# Patient Record
Sex: MALE | Race: OTHER | NOT HISPANIC OR LATINO | URBAN - METROPOLITAN AREA
[De-identification: names, ages, dates, MRNs, and addresses within clinical notes are randomized per-mention and may not be internally consistent; named-entity substitution may affect disease eponyms.]

---

## 2017-04-26 ENCOUNTER — EMERGENCY (EMERGENCY)
Facility: HOSPITAL | Age: 22
LOS: 1 days | Discharge: HOME | End: 2017-04-26
Attending: EMERGENCY MEDICINE | Admitting: EMERGENCY MEDICINE
Payer: COMMERCIAL

## 2017-04-26 VITALS
SYSTOLIC BLOOD PRESSURE: 120 MMHG | TEMPERATURE: 98 F | OXYGEN SATURATION: 100 % | HEART RATE: 98 BPM | RESPIRATION RATE: 16 BRPM | DIASTOLIC BLOOD PRESSURE: 74 MMHG

## 2017-04-26 PROCEDURE — 73564 X-RAY EXAM KNEE 4 OR MORE: CPT | Mod: 26,LT

## 2017-04-26 PROCEDURE — 99284 EMERGENCY DEPT VISIT MOD MDM: CPT

## 2017-04-26 RX ORDER — IBUPROFEN 200 MG
600 TABLET ORAL ONCE
Qty: 0 | Refills: 0 | Status: COMPLETED | OUTPATIENT
Start: 2017-04-26 | End: 2017-04-26

## 2017-04-26 RX ADMIN — Medication 600 MILLIGRAM(S): at 19:54

## 2017-04-26 NOTE — ED PROVIDER NOTE - CHPI ED SYMPTOMS NEG
no chest pain, no shortness of breath, no nausea, no vomiting, no numbness/weakness/tingling/no loss of consciousness

## 2017-04-26 NOTE — ED PROVIDER NOTE - CARE PLAN
Principal Discharge DX:	Knee pain  Instructions for follow-up, activity and diet:	Follow up with ortho this week.  Referral list provided.  Rest, ice and elevate knee.  Ambulate as tolerated. Take Motrin 600mg every 8 hrs for pain with food. Worsening pain, swelling, weakness, numbness return to ER
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
## 2017-04-26 NOTE — ED PROVIDER NOTE - PROGRESS NOTE DETAILS
BRYCE Helton: Received sign out from BRYCE LARRY to reassess patient and follow up on xrays. Xray wnl, no evidence of fx. Pt ambulating in RW w/o issue. Will dc w/ NSAIDs and ortho follow up if sx persist.

## 2017-04-26 NOTE — ED PROVIDER NOTE - PLAN OF CARE
Follow up with ortho this week.  Referral list provided.  Rest, ice and elevate knee.  Ambulate as tolerated. Take Motrin 600mg every 8 hrs for pain with food. Worsening pain, swelling, weakness, numbness return to ER

## 2017-04-26 NOTE — ED PROVIDER NOTE - NS ED MD SCRIBE ATTENDING SCRIBE SECTIONS
PHYSICAL EXAM/HIV/DISPOSITION/HISTORY OF PRESENT ILLNESS/REVIEW OF SYSTEMS/PAST MEDICAL/SURGICAL/SOCIAL HISTORY/VITAL SIGNS( Pullset)

## 2021-04-25 ENCOUNTER — INPATIENT (INPATIENT)
Facility: HOSPITAL | Age: 26
LOS: 3 days | Discharge: HOME | End: 2021-04-29
Attending: PSYCHIATRY & NEUROLOGY | Admitting: PSYCHIATRY & NEUROLOGY
Payer: MEDICAID

## 2021-04-25 VITALS
SYSTOLIC BLOOD PRESSURE: 134 MMHG | HEART RATE: 89 BPM | RESPIRATION RATE: 16 BRPM | DIASTOLIC BLOOD PRESSURE: 84 MMHG | TEMPERATURE: 99 F | OXYGEN SATURATION: 100 %

## 2021-04-25 DIAGNOSIS — F43.24 ADJUSTMENT DISORDER WITH DISTURBANCE OF CONDUCT: ICD-10-CM

## 2021-04-25 NOTE — ED ADULT NURSE NOTE - NSIMPLEMENTINTERV_GEN_ALL_ED
Patient
Implemented All Universal Safety Interventions:  [place] to call system. Call bell, personal items and telephone within reach. Instruct patient to call for assistance. Room bathroom lighting operational. Non-slip footwear when patient is off stretcher. Physically safe environment: no spills, clutter or unnecessary equipment. Stretcher in lowest position, wheels locked, appropriate side rails in place.

## 2021-04-25 NOTE — ED ADULT TRIAGE NOTE - CHIEF COMPLAINT QUOTE
Pt endorses taking a sip of antifreeze after an argument with his father at home tonight. Denies SI/HI, states "it was in the moment". Denies any pain, N/V/D. Denies PMH. Calm and cooperative in triage.

## 2021-04-26 DIAGNOSIS — T14.91XA SUICIDE ATTEMPT, INITIAL ENCOUNTER: ICD-10-CM

## 2021-04-26 DIAGNOSIS — F32.9 MAJOR DEPRESSIVE DISORDER, SINGLE EPISODE, UNSPECIFIED: ICD-10-CM

## 2021-04-26 LAB
ALBUMIN SERPL ELPH-MCNC: 4.4 G/DL — SIGNIFICANT CHANGE UP (ref 3.3–5)
ALP SERPL-CCNC: 52 U/L — SIGNIFICANT CHANGE UP (ref 40–120)
ALT FLD-CCNC: 12 U/L — SIGNIFICANT CHANGE UP (ref 4–41)
ANION GAP SERPL CALC-SCNC: 10 MMOL/L — SIGNIFICANT CHANGE UP (ref 7–14)
APPEARANCE UR: CLEAR — SIGNIFICANT CHANGE UP
AST SERPL-CCNC: 15 U/L — SIGNIFICANT CHANGE UP (ref 4–40)
BACTERIA # UR AUTO: NEGATIVE — SIGNIFICANT CHANGE UP
BASOPHILS # BLD AUTO: 0.04 K/UL — SIGNIFICANT CHANGE UP (ref 0–0.2)
BASOPHILS NFR BLD AUTO: 0.3 % — SIGNIFICANT CHANGE UP (ref 0–2)
BILIRUB SERPL-MCNC: 0.2 MG/DL — SIGNIFICANT CHANGE UP (ref 0.2–1.2)
BILIRUB UR-MCNC: NEGATIVE — SIGNIFICANT CHANGE UP
BLOOD GAS VENOUS COMPREHENSIVE RESULT: SIGNIFICANT CHANGE UP
BUN SERPL-MCNC: 14 MG/DL — SIGNIFICANT CHANGE UP (ref 7–23)
CALCIUM SERPL-MCNC: 9.2 MG/DL — SIGNIFICANT CHANGE UP (ref 8.4–10.5)
CHLORIDE SERPL-SCNC: 106 MMOL/L — SIGNIFICANT CHANGE UP (ref 98–107)
CO2 SERPL-SCNC: 26 MMOL/L — SIGNIFICANT CHANGE UP (ref 22–31)
COLOR SPEC: YELLOW — SIGNIFICANT CHANGE UP
CREAT SERPL-MCNC: 0.95 MG/DL — SIGNIFICANT CHANGE UP (ref 0.5–1.3)
DIFF PNL FLD: NEGATIVE — SIGNIFICANT CHANGE UP
EOSINOPHIL # BLD AUTO: 0.23 K/UL — SIGNIFICANT CHANGE UP (ref 0–0.5)
EOSINOPHIL NFR BLD AUTO: 2 % — SIGNIFICANT CHANGE UP (ref 0–6)
EPI CELLS # UR: 2 /HPF — SIGNIFICANT CHANGE UP (ref 0–5)
ETHANOL SERPL-MCNC: <10 MG/DL — SIGNIFICANT CHANGE UP
GLUCOSE SERPL-MCNC: 108 MG/DL — HIGH (ref 70–99)
GLUCOSE UR QL: NEGATIVE — SIGNIFICANT CHANGE UP
HCT VFR BLD CALC: 46.8 % — SIGNIFICANT CHANGE UP (ref 39–50)
HGB BLD-MCNC: 15.2 G/DL — SIGNIFICANT CHANGE UP (ref 13–17)
HYALINE CASTS # UR AUTO: 19 /LPF — HIGH (ref 0–7)
IANC: 8.5 K/UL — SIGNIFICANT CHANGE UP (ref 1.5–8.5)
IMM GRANULOCYTES NFR BLD AUTO: 0.6 % — SIGNIFICANT CHANGE UP (ref 0–1.5)
KETONES UR-MCNC: NEGATIVE — SIGNIFICANT CHANGE UP
LEUKOCYTE ESTERASE UR-ACNC: ABNORMAL
LYMPHOCYTES # BLD AUTO: 1.83 K/UL — SIGNIFICANT CHANGE UP (ref 1–3.3)
LYMPHOCYTES # BLD AUTO: 16 % — SIGNIFICANT CHANGE UP (ref 13–44)
MAGNESIUM SERPL-MCNC: 2 MG/DL — SIGNIFICANT CHANGE UP (ref 1.6–2.6)
MCHC RBC-ENTMCNC: 29.7 PG — SIGNIFICANT CHANGE UP (ref 27–34)
MCHC RBC-ENTMCNC: 32.5 GM/DL — SIGNIFICANT CHANGE UP (ref 32–36)
MCV RBC AUTO: 91.4 FL — SIGNIFICANT CHANGE UP (ref 80–100)
MONOCYTES # BLD AUTO: 0.76 K/UL — SIGNIFICANT CHANGE UP (ref 0–0.9)
MONOCYTES NFR BLD AUTO: 6.6 % — SIGNIFICANT CHANGE UP (ref 2–14)
NEUTROPHILS # BLD AUTO: 8.5 K/UL — HIGH (ref 1.8–7.4)
NEUTROPHILS NFR BLD AUTO: 74.5 % — SIGNIFICANT CHANGE UP (ref 43–77)
NITRITE UR-MCNC: NEGATIVE — SIGNIFICANT CHANGE UP
NRBC # BLD: 0 /100 WBCS — SIGNIFICANT CHANGE UP
NRBC # FLD: 0 K/UL — SIGNIFICANT CHANGE UP
OSMOLALITY SERPL: 305 MOSM/KG — HIGH (ref 275–295)
PCP SPEC-MCNC: SIGNIFICANT CHANGE UP
PH UR: 6 — SIGNIFICANT CHANGE UP (ref 5–8)
PHOSPHATE SERPL-MCNC: 3.1 MG/DL — SIGNIFICANT CHANGE UP (ref 2.5–4.5)
PLATELET # BLD AUTO: 255 K/UL — SIGNIFICANT CHANGE UP (ref 150–400)
POTASSIUM SERPL-MCNC: 3.6 MMOL/L — SIGNIFICANT CHANGE UP (ref 3.5–5.3)
POTASSIUM SERPL-SCNC: 3.6 MMOL/L — SIGNIFICANT CHANGE UP (ref 3.5–5.3)
PROT SERPL-MCNC: 7.5 G/DL — SIGNIFICANT CHANGE UP (ref 6–8.3)
PROT UR-MCNC: ABNORMAL
RBC # BLD: 5.12 M/UL — SIGNIFICANT CHANGE UP (ref 4.2–5.8)
RBC # FLD: 11.9 % — SIGNIFICANT CHANGE UP (ref 10.3–14.5)
RBC CASTS # UR COMP ASSIST: 2 /HPF — SIGNIFICANT CHANGE UP (ref 0–4)
SARS-COV-2 RNA SPEC QL NAA+PROBE: SIGNIFICANT CHANGE UP
SODIUM SERPL-SCNC: 142 MMOL/L — SIGNIFICANT CHANGE UP (ref 135–145)
SP GR SPEC: 1.03 — HIGH (ref 1.01–1.02)
TOXICOLOGY SCREEN, DRUGS OF ABUSE, SERUM RESULT: SIGNIFICANT CHANGE UP
TSH SERPL-MCNC: 0.57 UIU/ML — SIGNIFICANT CHANGE UP (ref 0.27–4.2)
UROBILINOGEN FLD QL: ABNORMAL
WBC # BLD: 11.43 K/UL — HIGH (ref 3.8–10.5)
WBC # FLD AUTO: 11.43 K/UL — HIGH (ref 3.8–10.5)
WBC UR QL: 16 /HPF — HIGH (ref 0–5)

## 2021-04-26 PROCEDURE — 99285 EMERGENCY DEPT VISIT HI MDM: CPT

## 2021-04-26 PROCEDURE — 71045 X-RAY EXAM CHEST 1 VIEW: CPT | Mod: 26

## 2021-04-26 RX ORDER — HYDROXYZINE HCL 10 MG
50 TABLET ORAL EVERY 6 HOURS
Refills: 0 | Status: DISCONTINUED | OUTPATIENT
Start: 2021-04-26 | End: 2021-04-29

## 2021-04-26 RX ORDER — DIPHENHYDRAMINE HCL 50 MG
25 CAPSULE ORAL ONCE
Refills: 0 | Status: COMPLETED | OUTPATIENT
Start: 2021-04-26 | End: 2021-04-26

## 2021-04-26 RX ADMIN — Medication 2 MILLIGRAM(S): at 07:28

## 2021-04-26 RX ADMIN — Medication 25 MILLIGRAM(S): at 06:40

## 2021-04-26 RX ADMIN — Medication 50 MILLIGRAM(S): at 21:22
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
## 2021-04-26 NOTE — BH PATIENT PROFILE - FUNCTIONAL SCREEN CURRENT LEVEL: DRESSING, MLM
Called patient about prescription. He said KL needs to fill out Medicare form B for his testing strips for insurance to cover.    0 = independent

## 2021-04-26 NOTE — ED ADULT NURSE REASSESSMENT NOTE - NS ED NURSE REASSESS COMMENT FT1
Report given to ASHVIN Gonzalez at [facility] in [place]. EMS called for transport, states their estimated time of arrival is 1hr. Pt continues to be sleeping at this time. In no apparent distress. Will reassess.

## 2021-04-26 NOTE — ED BEHAVIORAL HEALTH ASSESSMENT NOTE - RISK ASSESSMENT
Unmodifiable risk factors include age, male sex, hx of aggression towards property and others, recent suicidal behavior with stated suicidal intention at time of toxic ingestion. Modifiable risk factors include emotional dysregulation, daily cannabis use, lack of connection to treatment. Protective factors include denial of current SI/HI, lack of psychotic or manic history, lack of prior psych hospitalizations, residential stability, social support from family. At this time patient is at acutely elevated risk of harm to self. High Acute Suicide Risk

## 2021-04-26 NOTE — ED PROVIDER NOTE - CLINICAL SUMMARY MEDICAL DECISION MAKING FREE TEXT BOX
25M p/w antifreeze ingestion.  Mild CP here, exam unremarkable, VSS.  Will obtain labs to assess for toxic alcohol poisoning.  Will obtain EKG and psych clearance labs.  If marked derangements suggestive of significant poisoning, will obtain emergent toxicologic consult.  Will consult psych once labs result.  Will reassess and dispo pending results.

## 2021-04-26 NOTE — ED BEHAVIORAL HEALTH ASSESSMENT NOTE - DIFFERENTIAL
suicidal behavior  Cluster B traits vs personality disorder  r/o substance-induced mood disorder  r/o cannabis use disorder
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
## 2021-04-26 NOTE — ED BEHAVIORAL HEALTH ASSESSMENT NOTE - NSACTIVEVENT_PSY_ALL_CORE
Triggering events leading to humiliation, shame, and/or despair (e.g., Loss of relationship, financial or health status) (real or anticipated)/Substance intoxication or withdrawal

## 2021-04-26 NOTE — ED PROVIDER NOTE - OBJECTIVE STATEMENT
25M w/ no PMHx p/w antifreeze ingestion.  States he's been very angry the last few days and was in a heated argument w/ his father and ingested some antifreeze.  States he just wanted everything to stop so took it.  States he took a gulp but spat most of it out and swallowed very little.  Had some CP after.  Has had passive suicidal thoughts over the last few days but this is the first time he's acted on it.  Daily marijuana smoker.  Denies any SI/HI currently, but had them earlier.  No AH/VH.

## 2021-04-26 NOTE — ED PROVIDER NOTE - PROGRESS NOTE DETAILS
Antione PGY2 - No AG or acidemia on VBG.  Labs generally unremarkable.  EKG does not show any emergent findings.  Pt. very well-appearing.  No indication for emergent toxicology consultation.

## 2021-04-26 NOTE — ED ADULT NURSE REASSESSMENT NOTE - NS ED NURSE REASSESS COMMENT FT1
Pt sleeping comfortably at this time, calm and cooperative, awaiting for bed at [facility]. [facility] [place] states bed is not ready and Pt sleeping comfortably at this time, calm and cooperative, vital signs stable, in no apparent distress, awaiting for bed at [facility]. [facility] [place] states bed is not ready and they will not have a discharge until "late afternoon."

## 2021-04-26 NOTE — ED ADULT NURSE REASSESSMENT NOTE - NS ED NURSE REASSESS COMMENT FT1
Received report from Del LOCK, patient brought to low acuity . IV removed and pt undressed, belongings secured. Marijuana found on pt, charge aware and security notified. Pt offers no complaints, calm and cooperative at this time. RR even and unlabored, vitals as documented.

## 2021-04-26 NOTE — ED PROVIDER NOTE - ATTENDING CONTRIBUTION TO CARE
I performed a face-to-face evaluation of the patient and performed a history and physical examination. I agree with the history and physical examination.    Frustrated with father last night. Drank small amount of antifreeze. Lab results unremarkable. No indication for tox consult. Psych consult to determine benefit of admission.

## 2021-04-26 NOTE — ED BEHAVIORAL HEALTH ASSESSMENT NOTE - SUMMARY
Patient is a 26yo M, domiciled in private residence with parents and older brother, dropped out of 12th grade, self-employed working on cars, no formal psych hx, no prior psych hospitalizations, no psych meds, no outpatient therapy, denies prior hx of SA/SIB, daily marijuana use, occasional EtOH use, +hx of aggression/violence towards property and others, no PMHx, BIB father after drinking antifreeze with suicidal intent during argument with father.    On interview patient is cooperative but guarded around events leading to admission; speaks in low volume, is distantly related, appears mildly anxious but not acutely manic or psychotic. He endorses impulsive toxic ingestion iso emotional dysregulation during argument with father and states that he ingested the antifreeze in front of his father to "show him how angry I was." However, patient was aware of the potential lethality of antifreeze ingestion prior to taking action. Per father's report, father had to remove antifreeze from patient's possession to stop him from drinking more. There is also concern about the extent of patient's impulsivity and aggression (including throwing TV down stair case) over relatively minor argument. Given the above factors, the patient's actions earlier tonight may represent a true suicide attempt and therefore patient is at acutely elevated risk of harm to self. He requires inpatient psychiatric hospitalization for safety and stabilization. Patient is a 24yo M, domiciled in private residence with parents and older brother, dropped out of 12th grade, self-employed working on cars, no formal psych hx, no prior psych hospitalizations, no psych meds, no outpatient therapy, denies prior hx of SA/SIB, daily marijuana use, occasional EtOH use, +hx of aggression/violence towards property and others, no PMHx, BIB father after drinking antifreeze with suicidal intent during argument with father.    On interview patient is cooperative but guarded around events leading to admission; speaks in low volume, is distantly related, appears mildly anxious but not acutely manic or psychotic. He endorses impulsive toxic ingestion iso emotional dysregulation during argument with father and states that he ingested the antifreeze in front of his father to "show him how angry I was." However, patient was aware of the potential lethality of antifreeze ingestion prior to taking action. Per father's report, father had to remove antifreeze from patient's possession to stop him from drinking more. There is also concern about the extent of patient's impulsivity and aggression (including throwing TV down stair case) over relatively minor argument. Given the above factors, the patient's actions earlier tonight represents a true suicide attempt and therefore patient is at acutely elevated risk of harm to self. He requires inpatient psychiatric hospitalization for safety and stabilization.

## 2021-04-26 NOTE — ED BEHAVIORAL HEALTH ASSESSMENT NOTE - OTHER PAST PSYCHIATRIC HISTORY (INCLUDE DETAILS REGARDING ONSET, COURSE OF ILLNESS, INPATIENT/OUTPATIENT TREATMENT)
Diagnoses: none  Inpatient: none  Outpatient: none  SA/SIB: endorses intermittent passive SI during episodes of anger, denies SA prior to tonight, denies hx of SIB  Meds: none

## 2021-04-26 NOTE — ED BEHAVIORAL HEALTH NOTE - BEHAVIORAL HEALTH NOTE
SW attempted to reach pt's mother, Coy Cordoba, at [phone] to obtain collateral information.  Call went directly to voice mail; SW left a message requesting a return phone call.    LIZETT called pt's father, Beryl, at [phone]-message came on stating that the subscriber is not available.  Unable to leave a message at this time. SW attempted to reach pt's mother, Coy Cordoba, at [phone] to obtain collateral information.  Call went directly to voice mail; SW left a message requesting a return phone call.    LIZETT called pt's father, Beryl, at [phone]-message came on stating that the subscriber is not available.  Unable to leave a message at this time.    Addendum:  Call back received from pt's mother, Coy.  Pt's mother reports that pt has 'anger issues' and states that they have been going on for a few months.  Pt's mother states that pt often gets angry when he is smoking marijuana or when his parents talk with him about getting a job or doing something.  She reports that when pt gets angry, he threatens to kill himself.  Mother states that today is the first time he actually tried to harm himself by drinking antifreeze.  Mother states that they encourage pt to go to counseling or get a psychiatrist, but he refuses.  Pt's mother reports that pt smokes marijuana daily, and does drink alcohol as well.  She states that pt has never threatened to harm himself prior to this evening.  Mother states that pt could benefit from counseling and that he 'needs help'.  Mother also states that if pt is discharged, she can come and pick him up.

## 2021-04-26 NOTE — ED ADULT NURSE REASSESSMENT NOTE - NS ED NURSE REASSESS COMMENT FT1
Pt is awaiting for EMS transport to [facility].  Report was given before 12pm.  Pt has been resting and refused food or hydration.  Pt expressing that he wants to go home but he is compliant with plan to go [facility].
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
## 2021-04-26 NOTE — ED BEHAVIORAL HEALTH ASSESSMENT NOTE - DETAILS
hx of physical aggression towards others and property, denies physical aggression in the past year drank antifreeze during argument with father with suicidal intent parents aware of admission voicemail left for Dr. Mena - [phone]

## 2021-04-26 NOTE — ED PROVIDER NOTE - PHYSICAL EXAMINATION
GENERAL: Patient awake alert NAD.  HEENT: NC/AT, Moist mucous membranes.  LUNGS: CTAB, no wheezes or crackles.  CARDIAC: RRR, no m/r/g.  ABDOMEN: Soft, NT, ND, No rebound, guarding.  EXT: No edema. No calf tenderness. CV 2+DP/PT bilaterally.   MSK: No pain with movement, no deformities.  NEURO: A&Ox3. Moving all extremities.  SKIN: Warm and dry. No rash.  PSYCH: Normal affect.

## 2021-04-26 NOTE — ED ADULT NURSE REASSESSMENT NOTE - DESCRIPTION
Patient anxious, pacing and expressing fear of the unknown as regards to psych admission. Pt however remains in good behavioral control. Communicating well with staff and requesting meds for sleep. Medicated with Benadryl 25mg PO with no +effect. PO Ativan 2mg given additionally. Will monitor for effect. Safety maintained.

## 2021-04-26 NOTE — ED ADULT NURSE REASSESSMENT NOTE - NS ED NURSE REASSESS COMMENT FT1
Report received from night RN. Pt resting comfortably in bed, denies any pain/ symptoms at this time, states he is feeling better and would like to go home, calm and cooperative at this time, does not appear to be in distress, pt currently boarding in , will reassess.

## 2021-04-26 NOTE — ED BEHAVIORAL HEALTH NOTE - BEHAVIORAL HEALTH NOTE
COVID Exposure Screen- Patient  1.	*Have you had a COVID-19 test in the last 90 days?  (  ) Yes   (X) No   (  ) Unknown- Reason: _____  IF YES PROCEED TO QUESTION #2. IF NO OR UNKNOWN, PLEASE SKIP TO QUESTION #3.  2.	Date of test(s) and result(s): ________  3.	*Have you tested positive for COVID-19 antibodies? (  ) Yes   (X) No   (  ) Unknown- Reason: _____  IF YES PROCEED TO QUESTION #4. IF NO or UNKNOWN, PLEASE SKIP TO QUESTION #5.  4.	Date of positive antibody test: ________  5.	*Have you received 2 doses of the COVID-19 vaccine? (  ) Yes   (X) No   (  ) Unknown- Reason: _____   IF YES PROCEED TO QUESTION #6. IF NO or UNKNOWN, PLEASE SKIP TO QUESTION #7.  6.	Date of second dose: ________  7.	*In the past 10 days, have you been around anyone with a positive COVID-19 test?* (  ) Yes   (X) No   (  ) Unknown- Reason: ____  IF YES PROCEED TO QUESTION #8. IF NO or UNKNOWN, PLEASE SKIP TO QUESTION #13.  8.	Were you within 6 feet of them for at least 15 minutes? (  ) Yes   (  ) No   (  ) Unknown- Reason: _____  9.	Have you provided care for them? (  ) Yes   (  ) No   (  ) Unknown- Reason: ______  10.	Have you had direct physical contact with them (touched, hugged, or kissed them)? (  ) Yes   (  ) No    (  ) Unknown- Reason: _____  11.	Have you shared eating or drinking utensils with them? (  ) Yes   (  ) No    (  ) Unknown- Reason: ____  12.	Have they sneezed, coughed, or somehow gotten respiratory droplets on you? (  ) Yes   (  ) No    (  ) Unknown- Reason: ______  13.	*Have you been out of [place] within the past 10 days?* (X) Yes   (  ) No   (  ) Unknown- Reason: _____  IF YES PLEASE ANSWER THE FOLLOWING QUESTIONS:  14.	Which state/country have you been to? [place]  15.	Were you there over 24 hours? (  ) Yes   (X) No    (  ) Unknown- Reason: ______  16.	Date of return to [facility]: 4/24/21

## 2021-04-26 NOTE — ED PROVIDER NOTE - NS ED ROS FT
General: denies fever, chills, weight loss/weight gain.  HENT: denies nasal congestion, sore throat, rhinorrhea, ear pain.  Eyes: denies visual changes, blurred vision, eye discharge, eye redness.  Neck: denies neck pain, neck swelling.  CV: +chest pain; denies palpitations.  Resp: denies difficulty breathing, cough.  Abdominal: denies nausea, vomiting, diarrhea, abdominal pain, blood in stool, dark stool.  MSK: denies muscle aches, bony pain, leg pain, leg swelling.  Neuro: denies headaches, numbness, tingling, dizziness, lightheadedness.  Skin: denies rashes, cuts, bruises.  Hematologic: denies unexplained bruises.  Psych: SI at home.

## 2021-04-26 NOTE — ED BEHAVIORAL HEALTH ASSESSMENT NOTE - CASE SUMMARY
Pt is a 26yo single, male with no formal prior psychiatric history, who presented today s/p ingestion of antifreeze as suicide attempt in context of argument with father. Pt also threw a television down the stairs prior to this. On exam, pt is highly soft spoken, at times difficult to hear. He reports that he knew what antifreeze could do, but in the moment, he did not care if he lived or , admitting briefly that his attempt was a suicide attempt. PT reports that he knows about it from "around" and from TV shows. He denies any current SI/HI, depressive or manic sxs; states that he is embarrassed the police had to come for him. He does admit that he has been told to seek counseling for his anger issues, but he has never, proceeded with this. 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 Father reports argument was over the patient wanting another room. Father/Mother feel that the more he smokes MJ the more irritable he gets as well as mad. It is during those times when he will make an SI statement, but tonight he did actually act on it. There are also somewhat varied accounts of ingestion from pt and father which is concerning and cannot be teased out here in the ED. Due to overall picture of male, with h/o impulsivity, aggression, and agitation, drinking antifreeze and telling his father to "watch what I'm going to do" "I'm going to kill myself!" then proceed to drink the antifreeze demonstrates poor judgement, and possible underlying depressive state. Pt is in need of psychiatric stabilization at this time, will board for bed availability. Pt and mother informed.

## 2021-04-27 DIAGNOSIS — F12.20 CANNABIS DEPENDENCE, UNCOMPLICATED: ICD-10-CM

## 2021-04-27 DIAGNOSIS — F60.89 OTHER SPECIFIC PERSONALITY DISORDERS: ICD-10-CM

## 2021-04-27 LAB
A1C WITH ESTIMATED AVERAGE GLUCOSE RESULT: 5.4 % — SIGNIFICANT CHANGE UP (ref 4–5.6)
CHOLEST SERPL-MCNC: 176 MG/DL — SIGNIFICANT CHANGE UP
COVID-19 SPIKE DOMAIN AB INTERP: NEGATIVE — SIGNIFICANT CHANGE UP
COVID-19 SPIKE DOMAIN ANTIBODY RESULT: 0.4 U/ML — SIGNIFICANT CHANGE UP
ESTIMATED AVERAGE GLUCOSE: 108 MG/DL — SIGNIFICANT CHANGE UP (ref 68–114)
HDLC SERPL-MCNC: 42 MG/DL — SIGNIFICANT CHANGE UP
LIPID PNL WITH DIRECT LDL SERPL: 115 MG/DL — HIGH
NON HDL CHOLESTEROL: 134 MG/DL — HIGH
SARS-COV-2 IGG+IGM SERPL QL IA: 0.4 U/ML — SIGNIFICANT CHANGE UP
SARS-COV-2 IGG+IGM SERPL QL IA: NEGATIVE — SIGNIFICANT CHANGE UP
TRIGL SERPL-MCNC: 95 MG/DL — SIGNIFICANT CHANGE UP

## 2021-04-27 PROCEDURE — 99222 1ST HOSP IP/OBS MODERATE 55: CPT

## 2021-04-27 RX ADMIN — Medication 50 MILLIGRAM(S): at 21:57

## 2021-04-27 NOTE — BH INPATIENT PSYCHIATRY ASSESSMENT NOTE - HPI (INCLUDE ILLNESS QUALITY, SEVERITY, DURATION, TIMING, CONTEXT, MODIFYING FACTORS, ASSOCIATED SIGNS AND SYMPTOMS)
Patient is a 26yo M, domiciled in private residence with parents and older brother, dropped out of 12th grade, self-employed working on cars, no formal psych hx, no prior psych hospitalizations, no psych meds, no outpatient therapy, denies prior hx of SA/SIB, daily marijuana use, occasional EtOH use, +hx of aggression/violence towards property and others, no PMHx, BIB father to [facility] ED after suspected suicide attempt by drinking antifreeze during argument with father, now transferred to [facility] overnight.    Pt reports he had an argument with his father on Sunday 4/25/2021 regarding a room in the house that he wanted to store his things in. He reports he usually doesn't have arguments with his father so this made him more emotional. He reports going to get a bottle of antifreeze from the basement nearby and returned to his father. He reports acting as if he drank the antifreeze but denies drinking it and that he only let it spill down his shirt. He reports that his father did not take the bottle but he himself put it down. He reports wanting to scare his father into thinking that he could hurt himself but that he had no intention of self-injury or suicide, denies drinking or placing any of the antifreeze in his mouth. He reports knowing that antifreeze could damage his organs. He denies drinking or acting to drink antifreeze in the past. He denies thinking of any other means to hurt himself, attempt suicide, or pretend to hurt himself in front of his father at the time or ever in the past. He reports his father called the ambulance because his father thought he really drank antifreeze and was concerned. He reports his mother also was glad that his father called the ambulance because she was also scared about his health and drinking antifreeze. He reports, "I have too much to live for, suicide isn't even in the picture." He denies current S/H I/I/P.  He reports getting along with his brother and that he is only annoyed with his sister-in-law but that he does not fight or argue with her. He reports having a girlfriend and that they get along well with only 1-2 fights a year. He reports that he does not have issues with his anger and denies ever becoming verbally or physically aggressive. When confronted about ED report that he threw TV down the stairs, he reports the TV belonged to him and that this is not an issue.  He endorses using weed on a daily basis and does not feel that this has caused mood or behavioral changes. He also endorses vaping. He denies all other substance use, including alcohol, cocaine, pain killers.  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 He reports he does not need psychiatric help and does not want behavioral health services to follow up with. He reports his parents are thinking of getting a  to get him out of the hospital.    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 She reports he was upset and he made a mistake on Sunday night. She feels he has cooled down and that he sounds calm, comfortable when she spoke to him on the phone. He told her he never meant to do hurt himself and that he never meant to drink the antifreeze. She reports it never went inside his mouth and that he wanted to prove something to his dad. She reports that she felt he didn't need the ambulance on the night of Sun 4/25 because pt had told her that he hadn't really drank the antifreeze before the ambulance came.  She denies prior episodes of suicidal or self-injurious behavior or use of antifreeze in prior arguments. She denies that her son argues frequently with family members or have difficulty with his anger. She reports he usually will walk away from arguments and will play video games, watch TV, or work on his car. She denies any prior episodes of physical or verbal aggression in the past. She denies prior arguments leading to calling police. She is not aware of him using any other substance than weed. She hasn't noticed any mood changes or anger issues with weed use.  She reports pt will not eat or use the bathroom for bowel movements outside the house since he was a child.  She denies having guns or weapons in the house.    Per pt's father, he wants his son to come home because they need help with restaurant and they are having significant business losses without his son's help. He reports that pt is regretful of what happened and that it is potentially damaging for pt to be in the hospital, may make him depressed. He reports he did get in an argument with pt about a room in the house and that this was unusual as they do not usually fight. Pt went to get the antifreeze and returned to father. Pt's father reports he does not remember what the pt said to him. Pt then went onto act as if he was drinking the antifreeze, but pt's father reports he checked a video clip from cameras that they have in the house and saw that the pt did not actually drink the antifreeze and only spilled some down his shirt. He also reports initially thinking that pt intentionally threw the TV down the stairs but after checking video footage, he saw that his son accidentally hit the TV with his foot as the TV was near the staircase on its way to be thrown out. He reports the video footages cannot be replayed as he has lost them. He reports regarding the pt, "He's a good boy. I have no problem. It was not intentional. He's like a baby."  Pt's father reports that pt has no anger issues, has never threatened to kill himself. He reports pt gets along with his brother and sister-in-law, denies issues between pt and girlfriend. He denies prior verbal or physical aggression from pt in the past.  When confronted about inconsistency in collateral from ED note vs. current report, he states that he was overly excited and gave rash answers at the ED.  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    Per [facility] ED report 4/26/21:  "Patient states that tonight he had an argument with his father because his father was in his room and he felt his space was invaded. Patient states he and his father were yelling at each other for about 15 minutes, at which point he had the thought to impulsively drink antifreeze. He got antifreeze from the basement, brought it upstairs, and drank it in front of his father to "show him how angry I was." Patient endorses suicidal intent in the moment when he drank the antifreeze. Says that he stated to father "I'm going to kill myself" prior to starting to drink it. As soon as he started to take a drink he thought it was a bad idea so spit out most of it, swallowed a little. 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    Patient states he has chronic anger issues. Feels very easily angered by interpersonal issues and argues with family and girlfriend frequently. Also has conflict with brother's wife who stays with them every weekend. Endorses suicidal ideation when he feels very angry but denies ever acting on these thoughts prior to tonight. Says when he feels angry he has thoughts like "I just want the anger to stop" and "I don't care what happens to me." Denies hx of SIB. Has hx of physical fights in school due to anger. Also with hx of property destruction and physical aggression towards family and girlfriend (no arrests, no charges). States that in the past year he has been only verbally aggressive when angry. States he uses marijuana daily and that helps to calm him down. He endorses feeling anxious about his future and financial stability daily for the past year. Sleeping and eating well; states marijuana also helps with sleep. Denies depressed mood, anhedonia, poor concentration, and hopelessness. Denies hx c/f shalini or psychosis. He has never seen a therapist or psychiatrist.    Collateral obtained from patient's father Mr. Cordoba. Father states patient became angry earlier tonight because he demanded another room for himself in the house. Patient threw a TV down the stair case. Patient got antifreeze from the basement, said "watch what I'm going to do," and started to drink it until father took it away from him. Father states patient has pattern of getting very angry about 1x/week and saying "I'm going to kill myself," which has been happening for approx 2 years. Father denies patient taking action to harm himself before tonight. Denies patient has stated suicidal plan before tonight. Father believes patient's anger is related to his marijuana use. Father has been concerned that patient would attempt to harm himself during prior anger episodes."

## 2021-04-27 NOTE — PSYCHIATRIC REHAB INITIAL EVALUATION - NSBHALCSUBTREAT_PSY_ALL_CORE
Patient was not currently receiving outpatient therapy, and was not taking psychotropic medication prior to admission./None

## 2021-04-27 NOTE — BH INPATIENT PSYCHIATRY ASSESSMENT NOTE - CASE SUMMARY
Pt is a 26yo man with no PPHx presenting after heated argument with his father/family during which he threatened to commit suicide by ingestion of antifreeze (and did actually attempt to do so). Pt and parents now minimizing argument and suicide attempt, very preoccupied with discharge, and pt adamantly denying SIIP (also expresses remorse with regards to recent argument and potentially attempting to ingest antifreeze, which he describes as quite impulsive). Pt and parents are now also inconsistent in their report of what occurred. Despite this, pt without any evidence of acute mood or psychotic disorder. He has been in good behavioral control on the unit. Mood has been stable and pt sleeping well with good appetite. No evidence of shalini or psychosis. Suspect ddx intermittent explosive d/o vs cluster b personality pathology (or both). Pt refusing meds and no indication for such at this time. Will continue to monitor pt for any emergence of acute pathology. Pt may benefit from therapy with focus on anger management. Will use prn Atarax for management of anxiety.

## 2021-04-27 NOTE — BH INPATIENT PSYCHIATRY ASSESSMENT NOTE - DETAILS
drank antifreeze during argument with father with suicidal intent Positive for hx of aggression per ED note but pt and pt's parents now denying any aggression in the past

## 2021-04-27 NOTE — BH INPATIENT PSYCHIATRY ASSESSMENT NOTE - NSBHASSESSSUMMFT_PSY_ALL_CORE
Patient is a 24yo M, domiciled in private residence with parents and older brother, dropped out of 12th grade, self-employed working on cars, no formal psych hx, no prior psych hospitalizations, no psych meds, no outpatient therapy, denies prior hx of SA/SIB, daily marijuana use, occasional EtOH use, +hx of aggression/violence towards property and others, no PMHx, BIB father to [facility] ED after suspected suicide attempt by drinking antifreeze during argument with father, now transferred to [facility] overnight.    Pt and parents are both minimizing events leading up to admission and denying any suicidal intent behind drinking antifreeze, which is inconsistent with what they reported Mon 4/26 at [facility] ED. Pt and parents are also now denying all previously reported symptoms with difficulty with anger, aggression that they had endorsed at [facility] ED 4/26 which are concerning for IED. Uncertain if there may be substance-induced mood changes or psychosis with reported marijuana use, although denying and does not exhibit significant mood/psychotic symptoms at this time. There are also major inconsistencies present between different collaterals upon today's initial interviews with treatment team (mom reporting pt didn't need ambulance, pt reporting mom would also have called ambulance & father saying pt accidentally kicked TV, pt saying he was free to throw TV as it was his property). Both pt and pt's parents are fixated on discharge and presenting themselves very differently from ED visit. Pt does appear regretful and that he had not attempted suicide prior to Sun 4/25 but he has been threatening in the past (per [facility] note) with suicidal statements and appears to have acted impulsively on this, requiring further observation for safety.    1. C/w Atarax 50mg q6h PRN for anxiety  2. C/w Ativan 2mg IM PRN for agitation  3. Will continue to offer pt options for psychiatric f/u

## 2021-04-27 NOTE — BH INPATIENT PSYCHIATRY ASSESSMENT NOTE - NSBHMETABOLIC_PSY_ALL_CORE_FT
BMI:   HbA1c: A1C with Estimated Average Glucose Result: 5.4 % (04-27-21 @ 10:19)    Glucose:   BP: 133/81 (04-26-21 @ 15:58) (126/71 - 134/84)  Lipid Panel: Date/Time: 04-27-21 @ 10:19  Cholesterol, Serum: 176  Direct LDL: --  HDL Cholesterol, Serum: 42  Total Cholesterol/HDL Ration Measurement: --  Triglycerides, Serum: 95

## 2021-04-27 NOTE — BH INPATIENT PSYCHIATRY ASSESSMENT NOTE - NSBHCHARTREVIEWVS_PSY_A_CORE FT
Vital Signs Last 24 Hrs  T(C): 35.6 (27 Apr 2021 08:30), Max: 36.9 (26 Apr 2021 15:58)  T(F): 96.1 (27 Apr 2021 08:30), Max: 98.4 (26 Apr 2021 15:58)  HR: 65 (26 Apr 2021 15:58) (65 - 65)  BP: 133/81 (26 Apr 2021 15:58) (133/81 - 133/81)  BP(mean): --  RR: 18 (26 Apr 2021 15:58) (18 - 18)  SpO2: 100% (26 Apr 2021 15:58) (100% - 100%)

## 2021-04-27 NOTE — BH INPATIENT PSYCHIATRY ASSESSMENT NOTE - CURRENT MEDICATION
MEDICATIONS  (STANDING):    MEDICATIONS  (PRN):  hydrOXYzine hydrochloride 50 milliGRAM(s) Oral every 6 hours PRN Anxiety  LORazepam   Injectable 2 milliGRAM(s) IntraMuscular Once PRN severe agitation

## 2021-04-27 NOTE — BH SOCIAL WORK INITIAL PSYCHOSOCIAL EVALUATION - NSHIGHRISKBEH_PSY_ALL_CORE
Message  Follow up on results from ED visit for primary HSV outbreak  HSV viral culture confirms diagnosis as same  STD screening notable for positive gonorrhea  Ordered ceftriaxone 250mg IM and Azithromycin 1gm PO  RN team to notify patient and help her schedule appointment, as this has not been completed yet  Plan  Gonorrhea    · Azithromycin 500 MG Oral Tablet; Take 2 tablets in one dose by mouth; To  Be Done: [id number]   · CefTRIAXone Sodium 250 MG Injection Solution Reconstituted; INJECT 250   MG Intramuscular; To Be Done: [id number]    Results/Data     (1) CHLAMYDIA/GC AMPLIFIED DNA, PCR   CHLAMYDIA,AMPLIFIED DNA PROBE: C  trachomatis Amplified DNA Negative   Reference Range C  trachomatis Amplified DNA Negative  N  GONORRHOEAE AMPLIFIED DNA: N  gonorrhoeae Amplified DNA POSITIVE   Abnormal Reference Range N  gonorrhoeae Amplified DNA Negative   (1) HERPES SIMPLEX VIRUS CULTURE   HERPES SIMPLEX VIRUS CULTURE: Positive-Herpes Simplex Virus isolated     Abnormal Reference Range Negative-No Herpes Simplex Virus isolated , Toxic,  Contaminated  (1) RPR   RPR: Non-Reactive  Reference Range Non-Reactive  (1) HEP B SURFACE ANTIGEN   HEPATITIS B SURFACE ANTIGEN: Non-reactive  Reference Range Non-reactive,  NonReactive - Confirmed  (1) HEP C ANTIBODY   HEPATITIS C ANTIBODY: Non-reactive  Reference Range Non-reactive  (1) HIV AG/AB COMBO, 4TH GEN   HIV 1/2 AND P24: Non-Reactive  Reference Range Non-Reactive
Unable to assess

## 2021-04-27 NOTE — BH INPATIENT PSYCHIATRY ASSESSMENT NOTE - MSE UNSTRUCTURED FT
Male appearing stated age, dressed in street clothes, mediocre grooming and fair hygiene. Superficially cooperative, calm to interview. Eye contact is fair. Speech in normal in rate, loudness. Mood is "I'm perfectly fine" and affect is euthymic, full range. TP: linear, goal-directed, though at times perseverative. TC: fixated on discharge, denies S/H I/I/P. Does not appear to be responding to internal stimuli. Insight and judgment are poor. Impulse control is intact.

## 2021-04-27 NOTE — BH INPATIENT PSYCHIATRY ASSESSMENT NOTE - VIOLENCE RISK FACTORS:
Substance abuse/Impulsivity/Lack of insight into violence risk/need for treatment/Noncompliance with treatment

## 2021-04-27 NOTE — BH INPATIENT PSYCHIATRY ASSESSMENT NOTE - ADDITIONAL DETAILS ALL
endorses active suicidal intent at the time he ingested antifreeze in ED 4/26 but denying it in interview today

## 2021-04-27 NOTE — BH INPATIENT PSYCHIATRY ASSESSMENT NOTE - RISK ASSESSMENT
Pt is at elevated chronic risk with hx of conduct/anger management difficulties, hx of cannabis use. Acute risk factors include recent suicide attempt with antifreeze in context of argument with father and impulsivity, access to lethal means (antifreeze). Protective factors include supportive family, stable residence, employment/work, denying S/H I/I/P currently, no access to a gun, no prior psych hospitalizations. Due to recent acute risk factors that compromise pt's imminent safety, pt requires hospitalization for mitigation and stabilization.

## 2021-04-27 NOTE — BH SOCIAL WORK INITIAL PSYCHOSOCIAL EVALUATION - OTHER PAST PSYCHIATRIC HISTORY (INCLUDE DETAILS REGARDING ONSET, COURSE OF ILLNESS, INPATIENT/OUTPATIENT TREATMENT)
Patient is a 25 year old male who lives with his parents,  and Mrs. Cordoba, [phone], brother, and brother's wife and child on the weekends.  He has a long history of anger and aggression.  He got into fights in high school and continues to fight with his family and girlfriend, though, he states he has not been physically violent for a year.  The patient has no previous psychiatric history of any kind, no diagnosis, no treatment.  He uses Cannabis daily to help control his anger and to sleep.  He drinks ETOH but not regularly.  Patient  has no legal history.  He also has no previous history of suicide attempts.  However, when he gets angry he threatens to kill himself and feels like he does not want to continue to feel that way.  Yesterday he got into an argument with his father and threw a television down the stairs.  Then he went and got antifreeze and started to drink it in front of his father but his father took it away.  Both of his parents stressed that, despite regular threats of suicidal ideation he has never acted on these statements before.  As per unit 's report, the patient's mother did not want home admitted and wants to take him home, though she agreed he needs treatment.  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  He has no previous treatment and will need to be connected to a provider.  He has [facility] Medicaid, #[id number].

## 2021-04-27 NOTE — BH INPATIENT PSYCHIATRY ASSESSMENT NOTE - NSBHCHARTREVIEWLAB_PSY_A_CORE FT
15.2   11.43 )-----------( 255      ( 26 Apr 2021 00:40 )             46.8   04-26    142  |  106  |  14  ----------------------------<  108<H>  3.6   |  26  |  0.95    Ca    9.2      26 Apr 2021 00:40  Phos  3.1     04-26  Mg     2.0     04-26    TPro  7.5  /  Alb  4.4  /  TBili  0.2  /  DBili  x   /  AST  15  /  ALT  12  /  AlkPhos  52  04-26    Blood Gas Profile - Venous (04.26.21 @ 00:40)   pH, Venous: 7.37   pCO2, Venous: 46 mmHg   pO2, Venous: 52 mmHg   HCO3, Venous: 25 mmol/L   Base Excess, Venous: 1.5 mmol/L   Oxygen Saturation, Venous: 86.2 %

## 2021-04-27 NOTE — PSYCHIATRIC REHAB INITIAL EVALUATION - NSBHPRRECOMMEND_PSY_ALL_CORE
Writer met with patient in order to orient patient to unit, and introduce patient to psychiatric staff and department functions. Patient was verbal, polite, and cooperative with personal information. Patient was guarded at times, and minimizing symptoms. Writer collaborated with patient to select an appropriate psychiatric rehabilitation goal. Psychiatric rehabilitation staff will continue to engage patient daily in order to develop therapeutic rapport. In response to COVID19, unit programming will be re-evaluated on a consistent basis in effort to maintain safety guidelines.

## 2021-04-27 NOTE — BH SOCIAL WORK INITIAL PSYCHOSOCIAL EVALUATION - NSBHHOUSECOMMENTFT_PSY_ALL_CORE
Patient lives with his parents and brother and brother's wife and child on the weekends.  None of the issues below were reported.

## 2021-04-27 NOTE — BH INPATIENT PSYCHIATRY ASSESSMENT NOTE - DESCRIPTION
Dropped out of high school in 11th or 12th grade. No GED. Lives with mom, dad, and older brother. Brother is  and has a kid but wife lives in [place] during the week. 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 In relationship with female partner. Always had difficulty making friends, says he "doesn't trust anyone." Denies trauma history.

## 2021-04-28 PROCEDURE — 99232 SBSQ HOSP IP/OBS MODERATE 35: CPT

## 2021-04-28 NOTE — BH DISCHARGE NOTE NURSING/SOCIAL WORK/PSYCH REHAB - NSBHDCPHONE1FT_PSY_A_CORE
No appt was possible for Pt, as Pt declined services. Pt prohibited any information to be referred out on his behalf.

## 2021-04-28 NOTE — BH INPATIENT PSYCHIATRY PROGRESS NOTE - NSBHCHARTREVIEWVS_PSY_A_CORE FT
Vital Signs Last 24 Hrs  T(C): 36.6 (28 Apr 2021 08:13), Max: 36.9 (27 Apr 2021 20:38)  T(F): 97.9 (28 Apr 2021 08:13), Max: 98.4 (27 Apr 2021 20:38)  HR: --  BP: --  BP(mean): --  RR: --  SpO2: --  Orthostatic VS    04-28-21 @ 08:13  Lying BP: --/-- HR: --   Sitting BP: 122/80 HR: 80  Standing BP: 106/70 HR: 90  Site: --   Mode: --    04-27-21 @ 20:38  Lying BP: --/-- HR: --   Sitting BP: 116/68 HR: 68  Standing BP: --/-- HR: --  Site: --   Mode: --    04-27-21 @ 08:30  Lying BP: --/-- HR: --   Sitting BP: 133/79 HR: 80  Standing BP: 125/77 HR: 96  Site: --   Mode: --    04-26-21 @ 18:16  Lying BP: --/-- HR: --   Sitting BP: 133/81 HR: 76  Standing BP: 128/73 HR: 78  Site: --   Mode: --

## 2021-04-28 NOTE — BH INPATIENT PSYCHIATRY DISCHARGE NOTE - NSDCCPCAREPLAN_GEN_ALL_CORE_FT
PRINCIPAL DISCHARGE DIAGNOSIS  Diagnosis: Suicide attempt by alcohol poisoning, initial encounter  Assessment and Plan of Treatment: You reported attempting to drink antifreeze in context of argument with your father. This was concerning for dangerous impulsivity and maladaptive anger and we recommend following up with a psychiatrist or a therapist.       PRINCIPAL DISCHARGE DIAGNOSIS  Diagnosis: Intermittent explosive disorder  Assessment and Plan of Treatment:

## 2021-04-28 NOTE — BH DISCHARGE NOTE NURSING/SOCIAL WORK/PSYCH REHAB - PATIENT PORTAL LINK FT
You can access the FollowMyHealth Patient Portal offered by [facility] by registering at the following website: [email or web address]. By joining [facility] FollowMyHealth portal, you will also be able to view your health information using other applications (apps) compatible with our system.

## 2021-04-28 NOTE — BH DISCHARGE NOTE NURSING/SOCIAL WORK/PSYCH REHAB - NSDCPRGOAL_PSY_ALL_CORE
Upon admission, pt presented in the context of reported suicide attempt via ingestion of antifreeze (as per records, pt drank a sip of the antifreeze but then spit it out and only swallowed a small amount), due a recent intense argument with his father. Throughout hospitalization, pt denied SI/I/P despite this act being an attempt of suicide. There were some inconsistencies between families collateral report of what happened vs what providers were told in ED, such as that pt poured anti-freeze down his shirt rather than ingesting it. Pt and family minimized the events and were fixated on discharge from the hospital. Pt denied all mood and psychotic symptoms and did not exhibit any symptoms of acute mood or psychotic disorder. Pt remained in good behavioral control, and did not exhibit any aggression on the unit. Pt slept well and demonstrated good appetite. Pt was not started on any medications. Although pt was minimizing of events, pt was able to acknowledge the impulse nature of the act of ingesting the antifreeze. Pt and parents refused arrangement of psychiatric follow-up services, though pt was provided with information/education regarding [facility] should he desire outpatient psychiatric services in the future (and pt and mother stated intent to find pt anger management services on their own). Pt denied SI HI and AH/VH/TH on day of discharge. Pt ADLs were well maintained independently. Pt completed safety plan.

## 2021-04-28 NOTE — BH INPATIENT PSYCHIATRY PROGRESS NOTE - NSBHASSESSSUMMFT_PSY_ALL_CORE
Pt continues to deny all suicidal intent and reports inconsistent history from his parents. Pt continues to also deny prior episodes of anger which is well-documented by ED assessment. Both pt and parents continue to be preoccupied with discharge. Pt is minimally engaged on unit, refusing to speak with unit therapist when offered today. Pt does however continue to maintain behavioral control, sleep well, attend to ADLs. He does not have mood or psychotic symptoms that indicate starting a psychotropic medication currently but would benefit from psychotherapy and continued behavioral health follow up regarding longstanding anger issues related to possible IED or personality pathology that underlies this impulsive suicide attempt and prior outbursts.    1. C/w Atarax 50mg q6h PRN for anxiety  2. C/w Ativan 2mg IM PRN for agitation  3. Will continue to offer pt options for psychiatric f/u

## 2021-04-28 NOTE — BH DISCHARGE NOTE NURSING/SOCIAL WORK/PSYCH REHAB - NSDCNEXTLEVEL_PSY_ALL_CORE
No appt was possible for Pt, as Pt declined services. Pt prohibited any information to be referred out on his behalf./No

## 2021-04-28 NOTE — BH INPATIENT PSYCHIATRY DISCHARGE NOTE - NSBHDCMEDICALFT_PSY_A_CORE
Pt was evaluated at [facility] ED for ingestion of antifreeze. He was not found to have medical complications and was medically cleared.

## 2021-04-28 NOTE — BH DISCHARGE NOTE NURSING/SOCIAL WORK/PSYCH REHAB - NSCDUDCCRISIS_PSY_A_CORE
Critical access hospital [facility]  [phone]) Critical access hospital-WELL ([phone])  Text "WELL" to [phone]  Website: www.Kash/.[facility] 1 (786) 281-HRCE (1037) Website: www.safehorizon.org/.National Suicide Prevention Lifeline 0 (746) 576-1454/.  [facility]  1 (214) LIFENET (589-9988)/.  [facility]  [address] [place] [id number] [phone]   Hours:  Monday through Friday from 9 AM to 3 PM/.  [employer] [facility] Crisis Line  [phone], Option 1

## 2021-04-28 NOTE — BH INPATIENT PSYCHIATRY DISCHARGE NOTE - VIOLENCE RISK FACTORS:
Violent ideation/threat/speech/Substance abuse/Affective dysregulation/Impulsivity/Lack of insight into violence risk/need for treatment/Noncompliance with treatment

## 2021-04-28 NOTE — BH DISCHARGE NOTE NURSING/SOCIAL WORK/PSYCH REHAB - NSDCINSTRUCTIONS_PSY_ALL_CORE_FT
When discharged, take only medications prescribed as instructed by your hospital provider    Do not stop or change any medications until you discuss changes with your own prescriber    Do not take any other medications, including left over medications from before your admission, over the counter medications or herbal supplements, unless you discuss with your own provider Denies

## 2021-04-28 NOTE — BH INPATIENT PSYCHIATRY DISCHARGE NOTE - NSBHDCRISKMITIGATEFT_PSY_ALL_CORE
Pt is at chronically elevated risk due to prior suicide attempt, hx of angry outbursts (concerning for IED and/or cluster B personality pathology), hx of aggression/violence against others and property, hx of not being in treatment, marijuana use disorder, male gender. Acute risk factors include recent suicide attempt in context of interpersonal conflict with father, access to lethal means (antifreeze). Protective factors include currently denying S/H I/I/P, no mood or psychotic symptoms, stable housing, supportive family, employment. Acute risk factors were stabilized and mitigated with hospitalization. Pt and family were able to partly engage in safety planning with agreeing to not resort to lethal or suicidal means but refused to go to the ED or 911 if at imminent risk of harm or self or others. Pt is no longer at imminent risk of harm and is adequate to discharge. Pt is at chronically elevated risk of harm to self or others due to hx of angry outbursts (as documented in the ED, concerning for IED and/or cluster B personality pathology), hx of aggression/violence against others and property, marijuana use, and male gender. Acute risk factors include recent suicide attempt via ingestion of antifreeze (despite pt denying suicidal intent during hospitalization) in context of interpersonal conflict and anger towards father, now treated with inpatient hospitalization. 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 Though pt is declining outpatient psychiatric treatment, he aware of resources in the community should he need them in the future (including [facility], suicide hotline, and option to call 911 or go to the nearest ED should he consider himself to be an acute risk of harm to self or others in the future), which were discussed with patient at length prior to discharge (and such resources and contact info were included in pt's discharge paperwork).

## 2021-04-28 NOTE — BH INPATIENT PSYCHIATRY DISCHARGE NOTE - HPI (INCLUDE ILLNESS QUALITY, SEVERITY, DURATION, TIMING, CONTEXT, MODIFYING FACTORS, ASSOCIATED SIGNS AND SYMPTOMS)
Patient is a 26yo M, domiciled in private residence with parents and older brother, dropped out of 12th grade, self-employed working on cars, no formal psych hx, no prior psych hospitalizations, no psych meds, no outpatient therapy, denies prior hx of SA/SIB, daily marijuana use, occasional EtOH use, +hx of aggression/violence towards property and others, no PMHx, BIB father to [facility] ED after suspected suicide attempt by drinking antifreeze during argument with father, now transferred to [facility] overnight.    Pt reports he had an argument with his father on Sunday 4/25/2021 regarding a room in the house that he wanted to store his things in. He reports he usually doesn't have arguments with his father so this made him more emotional. He reports going to get a bottle of antifreeze from the basement nearby and returned to his father. He reports acting as if he drank the antifreeze but denies drinking it and that he only let it spill down his shirt. He reports that his father did not take the bottle but he himself put it down. He reports wanting to scare his father into thinking that he could hurt himself but that he had no intention of self-injury or suicide, denies drinking or placing any of the antifreeze in his mouth. He reports knowing that antifreeze could damage his organs. He denies drinking or acting to drink antifreeze in the past. He denies thinking of any other means to hurt himself, attempt suicide, or pretend to hurt himself in front of his father at the time or ever in the past. He reports his father called the ambulance because his father thought he really drank antifreeze and was concerned. He reports his mother also was glad that his father called the ambulance because she was also scared about his health and drinking antifreeze. He reports, "I have too much to live for, suicide isn't even in the picture." He denies current S/H I/I/P.  He reports getting along with his brother and that he is only annoyed with his sister-in-law but that he does not fight or argue with her. He reports having a girlfriend and that they get along well with only 1-2 fights a year. He reports that he does not have issues with his anger and denies ever becoming verbally or physically aggressive. When confronted about ED report that he threw TV down the stairs, he reports the TV belonged to him and that this is not an issue.  He endorses using weed on a daily basis and does not feel that this has caused mood or behavioral changes. He also endorses vaping. He denies all other substance use, including alcohol, cocaine, pain killers.  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 He reports he does not need psychiatric help and does not want behavioral health services to follow up with. He reports his parents are thinking of getting a  to get him out of the hospital.    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 She reports he was upset and he made a mistake on Sunday night. She feels he has cooled down and that he sounds calm, comfortable when she spoke to him on the phone. He told her he never meant to do hurt himself and that he never meant to drink the antifreeze. She reports it never went inside his mouth and that he wanted to prove something to his dad. She reports that she felt he didn't need the ambulance on the night of Sun 4/25 because pt had told her that he hadn't really drank the antifreeze before the ambulance came.  She denies prior episodes of suicidal or self-injurious behavior or use of antifreeze in prior arguments. She denies that her son argues frequently with family members or have difficulty with his anger. She reports he usually will walk away from arguments and will play video games, watch TV, or work on his car. She denies any prior episodes of physical or verbal aggression in the past. She denies prior arguments leading to calling police. She is not aware of him using any other substance than weed. She hasn't noticed any mood changes or anger issues with weed use.  She reports pt will not eat or use the bathroom for bowel movements outside the house since he was a child.  She denies having guns or weapons in the house.    Per pt's father, he wants his son to come home because they need help with restaurant and they are having significant business losses without his son's help. He reports that pt is regretful of what happened and that it is potentially damaging for pt to be in the hospital, may make him depressed. He reports he did get in an argument with pt about a room in the house and that this was unusual as they do not usually fight. Pt went to get the antifreeze and returned to father. Pt's father reports he does not remember what the pt said to him. Pt then went onto act as if he was drinking the antifreeze, but pt's father reports he checked a video clip from cameras that they have in the house and saw that the pt did not actually drink the antifreeze and only spilled some down his shirt. He also reports initially thinking that pt intentionally threw the TV down the stairs but after checking video footage, he saw that his son accidentally hit the TV with his foot as the TV was near the staircase on its way to be thrown out. He reports the video footages cannot be replayed as he has lost them. He reports regarding the pt, "He's a good boy. I have no problem. It was not intentional. He's like a baby."  Pt's father reports that pt has no anger issues, has never threatened to kill himself. He reports pt gets along with his brother and sister-in-law, denies issues between pt and girlfriend. He denies prior verbal or physical aggression from pt in the past.  When confronted about inconsistency in collateral from ED note vs. current report, he states that he was overly excited and gave rash answers at the ED.  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    Per [facility] ED report 4/26/21:  "Patient states that tonight he had an argument with his father because his father was in his room and he felt his space was invaded. Patient states he and his father were yelling at each other for about 15 minutes, at which point he had the thought to impulsively drink antifreeze. He got antifreeze from the basement, brought it upstairs, and drank it in front of his father to "show him how angry I was." Patient endorses suicidal intent in the moment when he drank the antifreeze. Says that he stated to father "I'm going to kill myself" prior to starting to drink it. As soon as he started to take a drink he thought it was a bad idea so spit out most of it, swallowed a little. 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    Patient states he has chronic anger issues. Feels very easily angered by interpersonal issues and argues with family and girlfriend frequently. Also has conflict with brother's wife who stays with them every weekend. Endorses suicidal ideation when he feels very angry but denies ever acting on these thoughts prior to tonight. Says when he feels angry he has thoughts like "I just want the anger to stop" and "I don't care what happens to me." Denies hx of SIB. Has hx of physical fights in school due to anger. Also with hx of property destruction and physical aggression towards family and girlfriend (no arrests, no charges). States that in the past year he has been only verbally aggressive when angry. States he uses marijuana daily and that helps to calm him down. He endorses feeling anxious about his future and financial stability daily for the past year. Sleeping and eating well; states marijuana also helps with sleep. Denies depressed mood, anhedonia, poor concentration, and hopelessness. Denies hx c/f shalini or psychosis. He has never seen a therapist or psychiatrist.    Collateral obtained from patient's father Mr. Cordoba. Father states patient became angry earlier tonight because he demanded another room for himself in the house. Patient threw a TV down the stair case. Patient got antifreeze from the basement, said "watch what I'm going to do," and started to drink it until father took it away from him. Father states patient has pattern of getting very angry about 1x/week and saying "I'm going to kill myself," which has been happening for approx 2 years. Father denies patient taking action to harm himself before tonight. Denies patient has stated suicidal plan before tonight. Father believes patient's anger is related to his marijuana use. Father has been concerned that patient would attempt to harm himself during prior anger episodes."

## 2021-04-28 NOTE — BH INPATIENT PSYCHIATRY DISCHARGE NOTE - NSBHDCHANDOFFNOFT_PSY_A_CORE
Pt refused all follow up psychiatric services Pt refused all follow up psychiatric services and is not being referred to another provider

## 2021-04-28 NOTE — BH INPATIENT PSYCHIATRY PROGRESS NOTE - NSBHFUPINTERVALHXFT_PSY_A_CORE
No acute events overnight. PRN Atarax 50mg PO utilized for anxiety, sleep. Pt slept throughout the night. Pt reports mood is "okay" and that he is trying to cope with being in the hospital. He continues to deny suicidal intent behind using antifreeze this past Sunday and that he made a mistake resorting to such a means to scare his father. He reports that appearing to drink antifreeze was a poor choice and that he would usually deal with arguments by walking away, cooling down. He reports he never had any anger issues and that he does not want to speak with the psychologist on the unit nor follow up with behavioral health services. When confronted by inconsistencies between reports at the ED versus during hospitalization, he reports that his father was being asked yes/no questions over the phone at 2am by ED staff while he was sleepy, thus ED staff misconstrued the story (but pt's father reported to writer yesterday that he provided rash answers in an overly excited/alarmed state).  He reports he is experiencing significant financial losses by being in the hospital and that he is considering pressing charges against hospital after discharge. He reports being in the hospital is making it worse for him because he does not belong here and he is not like the patients here who frequently require psychiatric hospitalizations. He denies feeling depressed, denies S/H I/I/P.    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 She denies her son had drank or placed the antifreeze in his mouth, denies that he ever had an anger issue. She reports she would like to pick him up from the hospital tomorrow.

## 2021-04-28 NOTE — BH INPATIENT PSYCHIATRY DISCHARGE NOTE - CASE SUMMARY
... I have reviewed the resident's discharge summary and made changes/edits where necessary.  Patient seen individually for discharge day management. The patient’s case has been reviewed with resident and treatment team. I have reviewed the resident's discharge summary, agree with its contents, and have made changes where necessary. The patient continues to report absence of thoughts of self-harm, suicide, aggression, or homicide. He is in appropriate behavioral control and does not exhibit any symptoms suggestive of acute mood or psychotic disorder. Given above patient does not appear to constitute imminent threat to self or others and is appropriate for discharge.  Patient provided with resources for [facility] and suicide hotline and instructed to call 911 or go to nearest emergency room with any thoughts of self-harm, suicide, aggression, or homicide. He expressed understanding of these emergency procedures and is in agreement with plan.

## 2021-04-28 NOTE — BH INPATIENT PSYCHIATRY DISCHARGE NOTE - HOSPITAL COURSE
Pt presented with... Pt presented to unit with minimizing disposition toward events leading to hospitalization and reported hx of angry outbursts. He denied all suicidal intent on drinking antifreeze and reported that he only acted to drink antifreeze despite ED reports where he endorsed drinking it with suicidal ideation. Pt denied all hx of issues with anger which were well-documented in ED note with prior hx of property damage, aggression toward pt's girlfriend, frequent arguments and outbursts 2x/week on average. Pt was fixated on discharge and volitionally altered answers toward this goal. Pt's parents were also minimizing and denying all accounts of suicidality in pt's intent and prior anger issues despite reporting at [facility] ED that pt had frequently stated "I'm going to kill myself" in the past 2 years during arguments or angry outbursts. Pt's reported history and collateral from parents were often inconsistent to one another and concerning for altering information with collective goal of discharge. In addition to claiming that pt was wrongly hospitalized for a non-suicidal incident, they claimed significant business and monetary losses as a reason for discharge. Patient and parents raised threatening statements to push providers toward discharge, including plans to cindy providers after discharge and to report to local news that pt was wrongly hospitalized.  Pt was not started on any medications as he did not present with symptoms of depression, shalini, or psychosis. He utilized Atarax 50mg PO PRNs for sleep and anxiety on the unit. We suspected a diagnosis of intermittent explosive disorder based on reported hx from [facility] ED regarding prior angry outbursts that is chronic in nature. Pt also reported hx of marijuana use which may have affected mood but this was not present during hospitalization. Pt maintained behaviorally in control, slept well, had good PO intake, and attended to ADLs. He was isolated on the unit and refused to attend both group and individual therapy. Overall pt continued to have poor insight. Pt was unable to fully participate in safety planning with claims that he would never go to the ED or call 911 due to his discontentment with how ED visit led to involuntary psychiatric admission. Pt reported he would not resort to extreme acts such as antifreeze or potentially lethal or suicidal means with future episodes of anger.  Pt refused all psychiatric follow up services and was discharge without follow up. He was discharged without medications. Pt initially presented s/p reported suicide attempt via ingestion of antifreeze (per ED records, pt had drank a sip of antifreeze but then spit it out and potentially swallowed only a small amount) in the context of intense argument with his father. Though pt reported suicidal intent during this act when in the ED, he adamantly and repeatedly denied suicidal intent throughout hospitalization. Both patient and his parents (who had initially called 911 due to concern about ingestion of antifreeze) were very inconsistent in their reporting of pt's symptoms and behavior between ED providers and providers on Low [facility] (for example, story changed to pt pouring antifreeze down his shirt rather than ingesting it). Patient and parents minimized this incident (as well as pt's well-documented hx of anger problems and related aggressive behavior) and were very fixated on pt being discharged instead. Though pt did minimize symptoms he consistently denied SIIP and HIIP. He denied all mood and psychotic symptoms and did not exhibit any symptoms or behavior suggestive of acute mood or psychotic disorder during hospitalization. Though irritable (related to wish for discharge), he was in good behavioral control and did not become aggressive or violent during hospitalization. He was noted to be present in the unit milieu, appropriately social with peers, and did attend several groups (though declined individual therapy). He slept well and had a good appetite. Mood was stable. Pt tended to ADLs independently and appropriately. During hospitalization pt was not started on any medication per his request/preference and given no clear indication/need for such. He utilized prn Atarax on a handful of occasions for anxiety. Despite minimizing events leading to hospitalization, pt did acknowledge impulsive nature of act of obtaining bottle of antifreeze to potentially ingest in the context of argument with his father and acknowledged that such an act could be perceived as dangerous and a risk to himself. Pt and parents refused arrangement of psychiatric follow-up services, though pt was provided with information/education regarding [facility] should he desire outpatient psychiatric services in the future (and pt and mother stated intent to find pt anger management services on their own). Leading ddx at time of discharge were intermittent explosive disorder and cluster b personality pathology.

## 2021-04-29 VITALS — TEMPERATURE: 98 F

## 2021-04-29 NOTE — BH INPATIENT PSYCHIATRY PROGRESS NOTE - CURRENT MEDICATION
MEDICATIONS  (STANDING):    MEDICATIONS  (PRN):  hydrOXYzine hydrochloride 50 milliGRAM(s) Oral every 6 hours PRN Anxiety  LORazepam   Injectable 2 milliGRAM(s) IntraMuscular Once PRN severe agitation  
MEDICATIONS  (STANDING):    MEDICATIONS  (PRN):  hydrOXYzine hydrochloride 50 milliGRAM(s) Oral every 6 hours PRN Anxiety  LORazepam   Injectable 2 milliGRAM(s) IntraMuscular Once PRN severe agitation

## 2021-04-29 NOTE — BH INPATIENT PSYCHIATRY PROGRESS NOTE - NSICDXBHSECONDARYDX_PSY_ALL_CORE
Suicidal behavior with attempted self-injury   T14.91XA  Moderate cannabis use disorder   F12.20  
Moderate cannabis use disorder   F12.20

## 2021-04-29 NOTE — BH INPATIENT PSYCHIATRY PROGRESS NOTE - NSBHCHARTREVIEWVS_PSY_A_CORE FT
Vital Signs Last 24 Hrs  T(C): 36.7 (29 Apr 2021 05:36), Max: 36.7 (29 Apr 2021 05:36)  T(F): 98 (29 Apr 2021 05:36), Max: 98 (29 Apr 2021 05:36)  HR: --  BP: --  BP(mean): --  RR: --  SpO2: -- Vital Signs Last 24 Hrs  T(C): 36.7 (29 Apr 2021 05:36), Max: 36.7 (29 Apr 2021 05:36)  T(F): 98 (29 Apr 2021 05:36), Max: 98 (29 Apr 2021 05:36)    Orthostatic VS    04-29-21 @ 05:36  Lying BP: --/-- HR: --   Sitting BP: 121/78 HR: 58  Standing BP: --/-- HR: --  Site: --   Mode: --    04-28-21 @ 20:47  Lying BP: --/-- HR: --   Sitting BP: 124/80 HR: 102  Standing BP: 129/74 HR: 98  Site: --   Mode: --    04-28-21 @ 08:13  Lying BP: --/-- HR: --   Sitting BP: 122/80 HR: 80  Standing BP: 106/70 HR: 90  Site: --   Mode: --    04-27-21 @ 20:38  Lying BP: --/-- HR: --   Sitting BP: 116/68 HR: 68  Standing BP: --/-- HR: --  Site: --   Mode: --

## 2021-04-29 NOTE — BH INPATIENT PSYCHIATRY PROGRESS NOTE - NSBHFUPINTERVALHXFT_PSY_A_CORE
No acute events overnight, no PRNs utilized. Pt slept throughout the night. Pt reports mood is "happy" knowing that he will be discharge today. He denies all suicidal intent behind drinking antifreeze prior to admission and denies current S/H I/I/P. He reports he wants to sleep and then sell cars after leaving the hospital. He agrees to not resort of suicidal or lethal means with intense emotions in the future but reports he would not go to the ED or call 911 because of how he was involuntarily admitted after doing so during this past episode. He asks providers why he was admitted in the first place and verbalized understanding when providers explained that based on ED reports, he was at higher acute risk of harm that warranted involuntary admission. He reports that he agreed with his mother to attend anger management after leaving the hospital. No acute events overnight, no PRNs utilized. Pt slept throughout the night. Pt reports mood is "happy" knowing that he will be discharge today. He denies all suicidal intent behind drinking antifreeze prior to admission and denies current S/H I/I/P. He reports he wants to sleep and then sell cars after leaving the hospital. He agrees to not resort of suicidal or lethal means with intense emotions in the future, continues to minimize events that occurred prior to admission. He reports that he agreed with his mother to attend anger management after leaving the hospital.

## 2021-04-29 NOTE — BH INPATIENT PSYCHIATRY PROGRESS NOTE - NSBHASSESSSUMMFT_PSY_ALL_CORE
Pt continues to minimize suicide attempt and prior hx of angry outbursts, aggression/violence and fixates on discharge today. He continues to have poor insight into need for admission for safety. Pt however continues to be in behavioral control, sleeping and eating well, attending to ADLs. Pt continues to refuse all psychiatric follow up offered by treatment team but reports plans to arrange his own anger management therapy after discharge.    1. C/w Atarax 50mg q6h PRN for anxiety  2. C/w Ativan 2mg IM PRN for agitation  3. Discharge today Pt continues to minimize suicide attempt and prior hx of angry outbursts, aggression/violence and fixates on discharge today. Pt however continues to be in behavioral control, sleeping and eating well, attending to ADLs. Pt continues to refuse all psychiatric follow up offered by treatment team but reports plans to arrange his own anger management therapy after discharge.    1. C/w Atarax 50mg q6h PRN for anxiety  2. C/w Ativan 2mg IM PRN for agitation  3. Discharge today

## 2021-04-29 NOTE — BH INPATIENT PSYCHIATRY PROGRESS NOTE - PRN MEDS
MEDICATIONS  (PRN):  hydrOXYzine hydrochloride 50 milliGRAM(s) Oral every 6 hours PRN Anxiety  LORazepam   Injectable 2 milliGRAM(s) IntraMuscular Once PRN severe agitation  
MEDICATIONS  (PRN):  hydrOXYzine hydrochloride 50 milliGRAM(s) Oral every 6 hours PRN Anxiety  LORazepam   Injectable 2 milliGRAM(s) IntraMuscular Once PRN severe agitation

## 2021-04-29 NOTE — BH INPATIENT PSYCHIATRY PROGRESS NOTE - MSE UNSTRUCTURED FT
Male appearing stated age, dressed in shirt and jeans clothes, fair grooming and fair hygiene. Superficially cooperative, calm to interview. Eye contact is somewhat intense. Speech in normal in rate, loudness. Mood is "happy" and affect is euthymic with slight irritable edge, full range. TP: linear, goal-directed, though perseverative. TC: fixated on discharge, denies S/H I/I/P. Does not appear to be responding to internal stimuli. Insight and judgment are poor. Impulse control is intact.
Male appearing stated age, dressed in shirt and jeans clothes, fair grooming and fair hygiene. Superficially cooperative, calm to interview. Eye contact is good. Speech in normal in rate, loudness. Mood is "okay" and affect is euthymic, full range. TP: linear, goal-directed, though perseverative. TC: fixated on discharge, denies S/H I/I/P. Does not appear to be responding to internal stimuli. Insight and judgment are poor. Impulse control is intact.

## 2021-04-29 NOTE — BH INPATIENT PSYCHIATRY PROGRESS NOTE - NSTXIMPULSGOAL_PSY_ALL_CORE
Will identify 1 behavior to cope with impulsive urges
Will identify 1 behavior to cope with impulsive urges
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
## 2022-02-01 NOTE — BH PATIENT PROFILE - NSDYSPHAGSECTTHREE_PSY_ALL_CORE
"Subjective:       Ilana Pruitt is a 36 y.o. female who presents for a postpartum visit. She is 6 weeks postpartum following a  for [employer] arrest of descent.   I have fully reviewed the prenatal and intrapartum course. The delivery was at 39 1/2 gestational weeks. Anesthesia: epidural. Postpartum course has been uncomplicated. Baby's course has been uncomplicated. Baby is feeding by breast. Bleeding no bleeding. Bowel function is normal. Bladder function is normal. Patient is not sexually active. Contraception method is oral progesterone-only contraceptive. Postpartum depression screening: negative.      The patient's history were reviewed and updated.    Review of Systems  Review of Systems neg        Objective:      /72   Ht 5' 2" (1.575 m)   Wt 55 kg (121 lb 4.1 oz)   LMP 2016   Breastfeeding? Yes   BMI 22.18 kg/m²    General:  alert and cooperative   Abdomen: soft, non-tender; bowel sounds normal; no masses,  no organomegaly Incision- intact, healing well, no sign of infection      Vulva:  normal   Vagina: normal vagina, no discharge, exudate, lesion, or erythema   Cervix:  no lesions   Corpus: normal size, contour, position, consistency, mobility, non-tender   Adnexa:  no mass, fullness, tenderness   Rectal Exam: Not performed.          Assessment:      6 week postpartum exam. Pap smear not done at today's visit.     Plan:      1. Contraception: oral progesterone-only contraceptive  2. Follow up for annual.   "
Patient's  called, left message that patient is c/o lower abdominal pain   is not on HIPAA  Advised have patient call back for herself 
N/A

## 2022-02-23 NOTE — BH INPATIENT PSYCHIATRY PROGRESS NOTE - NSBHATTESTSEENBY_PSY_A_CORE
1. DATE OF SURGERY: 2/23/2022    2. SURGEON:  Claudia Floyd MD, PhD, JARETH, Neurosurgeon    3. ASSISTANT:  Norberto Landaverde PA-C  An assistant was crucial to have during the case as the assistant helped with positioning, keeping the field clear of blood and retraction. This case could not be done easily without a qualified assistant. It was medically necessary.     4. TYPE OF ANESTHESIA:  General anesthesia with endotracheal intubation    5. PREOPERATIVE DIAGNOSIS:  Cervical stenosis      1.  Cervical spondylosis multilevel     2.  Clinical evidence of cervical myelopathy     3.  Severe cord compression C4-5 C5-6 with moderate stenosis C6-7     4.  Mild to moderate stenosis L3-4 and L4-5    6. POSTOPERATIVE DIAGNOSIS:  Cervical stenosis      1.  Cervical spondylosis multilevel     2.  Clinical evidence of cervical myelopathy     3.  Severe cord compression C4-5 C5-6 with moderate stenosis C6-7     4.  Mild to moderate stenosis L3-4 and L4-5      7. HISTORY: See formal admission H and P    1/28/2022  This is a very nice 69-year-old man.  He has 2 sets of complaints     1.  Lumbar     He reports long history of chronic low back pain.  He had disabling pain about 7 years ago.  He was miserable and wheezing from the walker.  With conservative therapy everything settled down.  In 2021 his pain recurred.  His worst complaint has been left posterior thigh.  It comes on when he stands and walks particular on 8 out of 10.  He had a single epidural.  This did not settle him.  He is taking over-the-counter medications.  When he sitting is pretty good.  He has some numbness and tingling in both feet.  When he stands he gets back pain.  He gets posterior left thigh pain.  It can be disabling.     2.  Cervical     From the neck point of view he has neck pain.  He also gets right shoulder pain.  Since 2021 he has noticed increasing unsteadiness when he walks.  Hand functions okay.  There is no numbness tingling or paresthesias in the 
Attending Psychiatrist supervising NP/Trainee, meeting pt...
legs.  In essence he has noticed neck pain which is worse in flexion.  It can be 6 out of 10.  There is a negative limit phenomenon.     He is pretty healthy otherwise.  He has had a vasectomy.  He had a single lumbar epidural [place].  He loves to play golf.  Previously was a  but is now retired.      8. PREOPERATIVE PHYSICAL EXAMINATION: See formal admission H and P    1/28/2022  Had a reduced range of motion of the cervical lumbar spine.  Positive Benito's with spread to the fingers in both hands.  He had diffuse increased reflexes throughout his upper and lower extremities.  There was a trace of weakness in left finger abduction and left finger extension.  He had reduced fine finger movements in left hand.  Unsteady tandem gait.  Babinski's are upgoing.     Last Imaging Result(s):         He had MRI scans and plain x-rays [facility].  The MRI scans were done on 8/31/2021.  In his lumbar spine he has mild to moderate stenosis at L3-4 and L4-5.     In the cervical spine he has imaging from 1/18/2022.  He has severe cord compression worst at C4-5 and C5-6 and moderate at C6-7.  There is myelomalacia in the cord in the left side at C6-7.         9. TITLE OF THE PROCEDURE:  • C4-5, C5-6, C6-7 anterior cervical decompression using a left sided approach and the microscope (adjacent partial corpectomies performed with greater than 50% vertebral body resection as part of the decompression and complete discectomy.)  • C4-5, C5-6, C6-7 interbody fusion using titanium interbody cages and bone graft substitute.  • C 45-67 anterior segmental fixation using a cervical locking plate.  • Microscopic microdissection.  • Fluoroscopic guidance for placement of the screws.    10. OPERATIVE FINDINGS:  Stenosis as outlined above.  The displaces are markedly collapsed.  I performed partial corpectomies at this level with greater than 5% vertebral body resection.  There was marked central and 
bilateral foraminal stenosis at every level.    11. OPERATED LEVELS:    C4-5, C5-6, C6-7  12. IMPLANTS USED:  ATEC   titanium interbody cages  Actifuse  Medtronic Oceana Translational locking plate    13. COMPLICATIONS:  Nil.    14. ESTIMATED BLOOD LOSS:    50    mL    15. OPERATIVE DETAILS:       After a fully informed consent, the patient was brought to the operating room.  General anesthesia was administered. The case was done with AP and lateral fluoroscopy and neurophysiological monitoring, which was stable throughout.  The patient was given intravenous antibiotic and intravenous dexamethasone.  The patient was positioned on a regular operating table.  The head was placed in gentle extension.  A shoulder roll was in place.  The head was resting on a donut headrest as well.  The shoulders were gently taped down and wrist restraints were used as well a footboard.  All pressure points were padded.     The left side of the neck was prepped and draped in a standard fashion.  Local anesthetic was placed into the wound prior to the skin incision.   After fluoroscopic localization, a transverse incision was effected as localized by the x-ray.  Dissection continued down to the platysma.  The plain above this was extensively undermined. The plain above the platysma was extensively undermined.  The platysma was then split in a longitudinal fashion.  Dissection then continued medial to the carotid sheath, lateral to the pharynx, through the prevertebral fascia, in an extensile fashion, to expose the anterior vertebral bodies.  The prevertebral fascia was divided with monopolar cautery.  The omohyoid muscle, if in the way, was divided with monopolar cautery.  I then placed a spinal needle into the affected disk space and this was confirmed on lateral fluoroscopy.  The longus colli muscles were then undermined on either side of the operated levels opposite the vertebral C4, 5, 6, 7. The Shadow-Line self-retaining 
retractors were then placed medially and laterally as well as cranially and caudally.  An appropriate number of 14 mm [place] distraction pins were then placed under fluoroscopic guidance into the midpoint of the vertebral parallel to the endplates of C4, C5, C6, C7.  The [place] distractor was used to achieve some measure of distraction.  For each affected disk, the disk space was incised and disk material was removed with a curette.     The operating microscope was then bought into the field. Using AM-12 and then an AM-8 Midas Kofi drill, a partial corpectomy was affected with a posterior lip of osteophyte drilled down with the AM-8 drill bit. Greater than 50% of adjacent vertebral body in total was removed.     Using a 5-0 angle curette, the PLL was split and the remaining disk, osteophyte and ligament at each affected level was removed with 1 and 2 mm Kerrison punches.  A good central and bilateral foraminal decompression at each level was affected.  Hemostasis was obtained.  This was done sequentially at each level.  I started at C4-5 and C5-6 and C6-7.    Once all the decompressions were done, I then turned to place the one of the interbody devices.  In each case using the cage trials appropriate sizes on x-ray where found.  The cages were then packed with bone graft substitute and placed into the interspace using fluoroscopic guidance.  Neuromonitoring continued to be stable.  I then removed the [place] distraction pins.  He has medium cages.  There are 7 mm in height.  There is a small cage at C4-5.     An appropriately sized anterior cervical locking plate was then secured across the operated levels using fluoroscopic guidance and appropriate length 4 mm diameter screws.  Bone purchase was good.  The locking apparatus was engaged.  Final AP and lateral x-rays were taken.  Neurophysiologic monitoring was stable.  The pharynx was inspected to ensure there was no injury.  I used a combination of 16 and 17 mm 
screws.     Closure was then affected in a standard fashion using 3-0 Vicryl sutures over a suction subfascial Hemovac. Dermabond was applied to the skin and a dressing and soft collar applied prior to transfer to recovery. All counts were correct and all instruments accounted for.    16 PROGNOSIS:  The surgery went well.  The patient has been decompressed.  At the end of the case, the patient awoke moving his/her upper and lower extremities well.    The plan will be to observe the patient very carefully for the next 24 hours in case there is any bleeding and I would anticipate the patient will be discharged tomorrow morning.  When the patient goes home, he/she will be discharged home on narcotic analgesia, flexeril and oral antibiotic, usually Keflex.  The plan will be to follow up in 2 weeks in the office.  We will call the patient next week to ensure there are not any problems.  He/she can shower in 72 hours but is instructed to keep the wound dry.  The patient has also been instructed to abstain from smoking or any anti-inflammatories. The patient has also been instructed to wear a soft collar except when eating or showering.       Claudia Floyd MD, PhD, JARETH              
Attending Psychiatrist supervising NP/Trainee, meeting pt...

## 2023-03-01 NOTE — ED BEHAVIORAL HEALTH ASSESSMENT NOTE - FAMILY DETAILS
Detail Level: Zone Additional Notes: 10% TCA next visit Render Risk Assessment In Note?: no lives w/mom, dad, older brother (27yo)

## 2025-01-24 NOTE — PSYCHIATRIC REHAB INITIAL EVALUATION - NSBHALCSUBCHOICE_PSY_ALL_CORE
Constant Observer Yes   Constant Observer Oriented yes   High risk patients are in line of sight at all times Yes   Excess equipment/medical supplies not necessary for the care of the patient removed Yes   All sharp or dangerous objects are removed from room: including but not limited to belts, pens & pencils, needles, medications, cosmetics, lighters, matches, nail files, watches, necklaces, glass objects, razors, razor blades, knives, aerosol sprays, drawstring pants, shoes, cords (telephone, call bells, etc.) cleaning wipes or other cleaning items, aluminum cans, not permanently attached wall décor Yes   Telephone/cell phone removed as well as TV remote (batteries can be swallowed) Yes   Patient belongings removed and labeled at nurses station Yes   Excess linen is removed from room Yes   All plastic bags are removed from the room and replaced with paper trash bags Yes   Patient is in paper scrubs or appropriate gown and using hospital socks with rubber soles Yes   No metal, hard eating utensils or hard plates are on meal tray Yes   Remove all cleaning agents used by Environmental Services Yes   If Crucifix is hanging on a nail, remove Crucifix as well as the nail Yes       *If any question above is answered \"No,\" documentation is required.        Mike Sanches RN  01/24/25 9072     Patient reports substance use in the context of daily marijuana and occasional ETOH use.

## 2025-02-12 NOTE — BH INPATIENT PSYCHIATRY ASSESSMENT NOTE - NSTXDCOPLKGOAL_PSY_ALL_CORE
Will agree to consider an appropriate level of outpatient care Pt c/o stomach pain with nausea and vomiting denies diarrhea. pt stated her older daughter had the same symptoms started Sunday night.
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
## 2025-03-25 NOTE — ED ADULT NURSE NOTE - NS ED NOTE  TALK SOMEONE YN
HPI   Chief Complaint   Patient presents with    Stroke     Patient arrived via ems for stroke symptoms. Per daughter, patient was alert and oriented around 2105 today, then became confused. Patient is on blood thinners, no stroke history.       HPI: 72-year-old male presents via EMS for concerns for possible stroke.  The patient's daughter called EMS because he was having left-sided weakness and had difficulty speaking.  They state that he is on Plavix.  Patient has a history of coronary artery disease spinal stenosis.    Family HX: Denies any significant/pertinent family history.  Social Hx: Denies ETOH or drug use.  Review of systems:  Gen.: No weight loss, fatigue, anorexia, insomnia, fever.   Eyes: No vision loss, double vision, drainage, eye pain.   ENT: No pharyngitis, dry mouth.   Cardiac: No chest pain, palpitations, syncope, near syncope.   Pulmonary: No shortness of breath, cough, hemoptysis.   Heme/lymph: No swollen glands, fever, bleeding.   GI: No abdominal pain, change in bowel habits, melena, hematemesis, hematochezia, nausea, vomiting, diarrhea.   : No discharge, dysuria, frequency, urgency, hematuria.   Musculoskeletal: No limb pain, joint pain, joint swelling.   Skin: No rashes.   Psych: No depression, anxiety, suicidality, homicidality.   Review of systems is otherwise negative unless stated above or in history of present illness.    Physical Exam:    Appearance: Alert, oriented , cooperative,  in no acute distress. Well nourished & well hydrated.    Skin: Intact,  dry skin, no lesions, rash, petechiae or purpura.     Eyes: PERRLA, EOMs intact,  Conjunctiva pink with no redness or exudates. Eyelids without lesions. No scleral icterus.     ENT: Hearing grossly intact. External auditory canals patent, tympanic membranes intact with visible landmarks. Nares patent, mucus membranes moist. Dentition without lesions. Pharynx clear, uvula midline.     Neck: Supple, without meningismus. Thyroid not  palpable. Trachea at midline. No lymphadenopathy.    Pulmonary: Clear bilaterally with good chest wall excursion. No rales, rhonchi or wheezing. No accessory muscle use or stridor.    Cardiac: Normal S1, S2 without murmur, rub, gallop or extrasystole. No JVD, Carotids without bruits.    Abdomen: Soft, nontender, active bowel sounds.  No palpable organomegaly.  No rebound or guarding.  No CVA tenderness.    Genitourinary: Exam deferred.    Musculoskeletal: Full range of motion. no pain, edema, or deformity. Pulses full and equal. No cyanosis, clubbing, or edema.    Neurological:   NIH stroke scale is 3.  Van negative    Psychiatric: Appropriate mood and affect.     Medical Decision-Making:  Testing: EKG was obtained which is interpreted by me shows a sinus rhythm rate of 60 without evidence of obvious ST elevations or T wave inversions to indicate acute ischemia or infarct.  Patient was a stroke alert.  He was evaluated by at the bedside by the telestroke attending.  He had gone to the CT scanner and had a negative CT of the brain as well as a read as essentially unremarkable CTA.  Initially the telestroke physician recommended that we do TNK given his profound initial expressive aphasia.  However I went in the the room to talk to the patient and his wife about this and I was informed by the nursing staff that the patient now has markedly improved speech.  On my reevaluation his speech is clear.  He is able to answer my questions appropriately.  He is following all commands therefore I did have a discussion with his wife Kaiele who is at the bedside.  At this time she has declined the TNK.  I feel that this is reasonable given that he has had such marked improvement of his symptoms including his expressive aphasia.  He will be admitted however for stroke workup.  We checked labs as well to be sure that there was not a electrolyte abnormality or such as hypoglycemia that could account for some of this as well he does  not have a hypoglycemia he has a glucose of 98.  He has admitted the elevated troponin to 23.  He has a white count of 11 and a normal hemoglobin.  Would benefit for admission and further stroke workup including MRI  Treatment:   Reevaluation:   Plan: AdmitPatient and family/friend/caregiver are in agreement with this plan.   Impression:   1.  Exepressive aphasia  2.    Labs Reviewed  CBC WITH AUTO DIFFERENTIAL - Abnormal     WBC                           11.4 (*)               nRBC                          0.0                    RBC                           4.48 (*)               Hemoglobin                    14.4                   Hematocrit                    42.5                   MCV                           95                     MCH                           32.1                   MCHC                          33.9                   RDW                           14.2                   Platelets                     268                    Neutrophils %                 63.3                   Immature Granulocytes %, Automated   0.4                    Lymphocytes %                 20.8                   Monocytes %                   9.8                    Eosinophils %                 5.1                    Basophils %                   0.6                    Neutrophils Absolute          7.20 (*)               Immature Granulocytes Absolute, Au*   0.05                   Lymphocytes Absolute          2.37                   Monocytes Absolute            1.12 (*)               Eosinophils Absolute          0.58 (*)               Basophils Absolute            0.07                COMPREHENSIVE METABOLIC PANEL - Abnormal     Glucose                       98                     Sodium                        138                    Potassium                     4.0                    Chloride                      104                    Bicarbonate                   26                     Anion Gap                      12                     Urea Nitrogen                 32 (*)                 Creatinine                    1.68 (*)               eGFR                          43 (*)                 Calcium                       9.1                    Albumin                       4.2                    Alkaline Phosphatase          88                     Total Protein                 7.2                    AST                           15                     Bilirubin, Total              1.3 (*)                ALT                           17                  TROPONIN I, HIGH SENSITIVITY - Abnormal     Troponin I, High Sensitivity   23 (*)                   Narrative: Less than 99th percentile of normal range cutoff-                Female and children under 18 years old <14 ng/L; Male <21 ng/L: Negative                Repeat testing should be performed if clinically indicated.                                 Female and children under 18 years old 14-50 ng/L; Male 21-50 ng/L:                Consistent with possible cardiac damage and possible increased clinical                 risk. Serial measurements may help to assess extent of myocardial damage.                                 >50 ng/L: Consistent with cardiac damage, increased clinical risk and                myocardial infarction. Serial measurements may help assess extent of                 myocardial damage.                                  NOTE: Children less than 1 year old may have higher baseline troponin                 levels and results should be interpreted in conjunction with the overall                 clinical context.                                 NOTE: Troponin I testing is performed using a different                 testing methodology at [facility] than at other                 [facility] hospitals. Direct result comparisons should only                 be made within the same method.  POCT GLUCOSE - Abnormal     POCT Glucose                  119  (*)             PROTIME-INR - Normal     Protime                       11.7                   INR                           1.1                 APTT - Normal     aPTT                          29                       Narrative: The APTT is no longer used for monitoring Unfractionated Heparin Therapy. For monitoring Heparin Therapy, use the Heparin Assay.  HEMOGLOBIN A1C  B-TYPE NATRIURETIC PEPTIDE  LIPID PANEL  POCT GLUCOSE METER     CT brain attack angio head and neck W and WO IV contrast   Final Result    No evidence for significant stenosis of the cervical vessels. Up to    20% narrowing of the proximal right cervical ICA.          No evidence for significant stenosis or large branch vessel cutoffs    of the intracranial vessels.          MACRO:    None.          Signed by: Jyoti West 3/24/2025 10:39 PM    Dictation workstation:   [id number]     CT brain attack head wo IV contrast   Final Result    No acute intracranial hemorrhage or mass effect. MRI may be obtained    as clinically indicated to evaluate for acute ischemic infarct.          Parenchymal volume loss and mild nonspecific white matter changes.          Chronic appearing paranasal sinus inflammatory changes. Underlying    fungal process not excluded.          MACRO:    Jyoti West discussed the significance and urgency of this    critical finding by telephone with  ALLISON MEDELLIN on 3/24/2025 at    10:20 pm.  (**-RCF-**) Findings:  See findings.                Signed by: Jyoti West 3/24/2025 10:22 PM    Dictation workstation:   [id number]     Transthoracic Echo (TTE) Complete    (Results Pending)         History provided by:  EMS personnel  History limited by:  Mental status change          Patient History   Past Medical History:   Diagnosis Date    Arthritis     Coronary artery disease     Hearing aid worn     bilateral    Heart disease     HL (hearing loss)     Hyperlipidemia     Hypertension     Joint pain     Nephrolithiasis      PONV (postoperative nausea and vomiting)     Vertigo     Wears glasses      Past Surgical History:   Procedure Laterality Date    ANKLE FRACTURE SURGERY Left     CARDIAC CATHETERIZATION  09/27/2023    4 VALENTINA to Circ    CARDIAC CATHETERIZATION N/A 10/05/2023    Procedure: PCI VALENTINA Stent- Coronary;  Surgeon: Deborah Oates MD;  Location: [facility];  Service: Cardiovascular;  Laterality: N/A;  PCI of the SVG to OM1 and OM2. IV NS 75/hr for 2 hours prior to procedure. BMP on admit.    CARDIAC CATHETERIZATION N/A 10/05/2023    Procedure: Left Heart Cath, No LV;  Surgeon: Deborah Oates MD;  Location: [facility];  Service: Cardiovascular;  Laterality: N/A;    CARPAL TUNNEL RELEASE      COLONOSCOPY      CORONARY ANGIOPLASTY  09/27/2023    4 VALENTINA to Circ    CORONARY ARTERY BYPASS GRAFT  2008    CORONARY STENT PLACEMENT      CYSTOSCOPY      JOINT REPLACEMENT Bilateral     LEG SURGERY Left     left lower leg fracture    LITHOTRIPSY      SHOULDER SURGERY Right 06/2024    SINUS SURGERY      TOTAL KNEE ARTHROPLASTY Bilateral     TOTAL SHOULDER ARTHROPLASTY Left      Family History   Problem Relation Name Age of Onset    Coronary artery disease Mother      Other (PTCA) Mother      Leukemia Father       Social History     Tobacco Use    Smoking status: Never    Smokeless tobacco: Current     Types: Chew   Vaping Use    Vaping status: Never Used   Substance Use Topics    Alcohol use: Never    Drug use: Never       Physical Exam   ED Triage Vitals   Temp Pulse Resp BP   -- -- -- --      SpO2 Temp src Heart Rate Source Patient Position   -- -- -- --      BP Location FiO2 (%)     -- --       Physical Exam      ED Course & MDM   Diagnoses as of 03/25/25 0114   Expressive aphasia                 No data recorded     [place] Coma Scale Score: 15 (03/25/25 0038 : Gisela Silverio RN)       NIH Stroke Scale: 0 (03/24/25 2325 : Elaine Escobar RN)                   Medical Decision Making      Procedure  Procedures      Alize Florentino MD  03/25/25 0114     No